# Patient Record
Sex: MALE | Race: WHITE | NOT HISPANIC OR LATINO | URBAN - METROPOLITAN AREA
[De-identification: names, ages, dates, MRNs, and addresses within clinical notes are randomized per-mention and may not be internally consistent; named-entity substitution may affect disease eponyms.]

---

## 2023-11-20 ENCOUNTER — HOSPITAL ENCOUNTER (EMERGENCY)
Facility: HOSPITAL | Age: 73
Discharge: HOME | End: 2023-11-20
Attending: STUDENT IN AN ORGANIZED HEALTH CARE EDUCATION/TRAINING PROGRAM
Payer: OTHER GOVERNMENT

## 2023-11-20 VITALS
SYSTOLIC BLOOD PRESSURE: 147 MMHG | HEIGHT: 67 IN | HEART RATE: 79 BPM | OXYGEN SATURATION: 97 % | TEMPERATURE: 98 F | DIASTOLIC BLOOD PRESSURE: 71 MMHG | RESPIRATION RATE: 18 BRPM | WEIGHT: 171.75 LBS | BODY MASS INDEX: 26.96 KG/M2

## 2023-11-20 DIAGNOSIS — M25.512 ACUTE PAIN OF LEFT SHOULDER: Primary | ICD-10-CM
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
## 2023-11-20 PROCEDURE — 25000003 PHARM REV CODE 250: Performed by: STUDENT IN AN ORGANIZED HEALTH CARE EDUCATION/TRAINING PROGRAM

## 2023-11-20 PROCEDURE — 99283 EMERGENCY DEPT VISIT LOW MDM: CPT

## 2023-11-20 RX ORDER — ACETAMINOPHEN 500 MG
1000 TABLET ORAL
Status: COMPLETED | OUTPATIENT
Start: 2023-11-20 | End: 2023-11-20

## 2023-11-20 RX ORDER — OXYCODONE AND ACETAMINOPHEN 5; 325 MG/1; MG/1
1 TABLET ORAL EVERY 6 HOURS PRN
Qty: 15 TABLET | Refills: 0 | Status: SHIPPED | OUTPATIENT
Start: 2023-11-20

## 2023-11-20 RX ORDER — IBUPROFEN 800 MG/1
800 TABLET ORAL
Status: COMPLETED | OUTPATIENT
Start: 2023-11-20 | End: 2023-11-20

## 2023-11-20 RX ADMIN — ACETAMINOPHEN 1000 MG: 500 TABLET ORAL at 01:11

## 2023-11-20 RX ADMIN — IBUPROFEN 800 MG: 800 TABLET, FILM COATED ORAL at 01:11

## 2023-11-20 NOTE — ED PROVIDER NOTES
Encounter Date: 11/20/2023       History     Chief Complaint   Patient presents with    Fall     73 year old male with a PMHx of anxiety, BPH presents to the ED with left shoulder pain. Had a fall off a 4 ft ladder on Saturday, landing on concrete on his left shoulder. NO head trauma. No LOC, no headache, no neck pain, no back pain. Ambulatory. States ROM has been difficult with the left shoulder. He has pain from the left shoulder to the left elbow. No numbness, paresthesia, weakness of the left arm.        Review of patient's allergies indicates:   Allergen Reactions    Penicillins Blisters, Nausea And Vomiting, Rash, Shortness Of Breath, Swelling and Hives     Other reaction(s): SWELLING (NON-SPECIFIC), Swelling, Blister    Sulfa (sulfonamide antibiotics) Hives, Shortness Of Breath and Swelling     Past Medical History:   Diagnosis Date    Anxiety     BPH with elevated PSA     Insomnia     Urinary retention      Past Surgical History:   Procedure Laterality Date    APPENDECTOMY      COLONOSCOPY  2002    about 20 years ago    COLONOSCOPY N/A 3/17/2022    Procedure: COLONOSCOPY;  Surgeon: Zahraa Benjamin MD;  Location: [facility];  Service: Endoscopy;  Laterality: N/A;     Family History   Problem Relation Age of Onset    Kidney failure Mother     Lung cancer Father     Colon cancer Paternal Uncle      Social History     Tobacco Use    Smoking status: Never    Smokeless tobacco: Never   Substance Use Topics    Alcohol use: Not Currently    Drug use: Never     Review of Systems   Constitutional:  Negative for chills and fever.   HENT:  Negative for congestion, rhinorrhea and sneezing.    Eyes:  Negative for discharge and redness.   Respiratory:  Negative for cough and shortness of breath.    Cardiovascular:  Negative for chest pain and palpitations.   Gastrointestinal:  Negative for abdominal pain, diarrhea and vomiting.   Genitourinary:  Negative for difficulty urinating, flank pain and urgency.    Musculoskeletal:  Positive for arthralgias. Negative for back pain, joint swelling and neck pain.   Skin:  Negative for rash and wound.   Neurological:  Negative for weakness, numbness and headaches.       Physical Exam     Initial Vitals [11/20/23 0128]   BP Pulse Resp Temp SpO2   (!) 147/71 84 18 97.8 °F (36.6 °C) 98 %      MAP       --         Physical Exam    Nursing note and vitals reviewed.  Constitutional: He appears well-developed. He is not diaphoretic. No distress.   HENT:   Head: Normocephalic and atraumatic.   Right Ear: External ear normal.   Left Ear: External ear normal.   Nose: Nose normal.   Eyes: Conjunctivae are normal. Right eye exhibits no discharge. Left eye exhibits no discharge. No scleral icterus.   Cardiovascular:  Normal rate and regular rhythm.           Pulmonary/Chest: Breath sounds normal. No stridor. No respiratory distress. He has no wheezes. He has no rhonchi. He has no rales.   Abdominal: Abdomen is soft. He exhibits no distension. There is no abdominal tenderness. There is no guarding.   Musculoskeletal:         General: No edema.      Left shoulder: Tenderness and bony tenderness present. Decreased range of motion.      Left upper arm: Tenderness present. No swelling, deformity or bony tenderness.      Left elbow: No swelling or deformity. Normal range of motion. Tenderness present.      Cervical back: No bony tenderness.      Thoracic back: No bony tenderness.      Lumbar back: No bony tenderness.      Comments: Ambulatory     Neurological: He is alert and oriented to person, place, and time. GCS score is 15. GCS eye subscore is 4. GCS verbal subscore is 5. GCS motor subscore is 6.   Skin: Skin is warm and dry. Capillary refill takes less than 2 seconds.   Psychiatric: He has a normal mood and affect.         ED Course   Procedures  Labs Reviewed - No data to display       Imaging Results              X-Ray Shoulder 2 or More Views Left (Final result)  Result time 11/20/23  08:15:32      Final result by Karen Leal MD (11/20/23 08:15:32)                   Impression:      As above.      Electronically signed by: Karen Leal MD  Date:    11/20/2023  Time:    08:15               Narrative:    EXAMINATION:  XR SHOULDER COMPLETE 2 OR MORE VIEWS LEFT    CLINICAL HISTORY:  fall;    TECHNIQUE:  Two views of the left shoulder    COMPARISON:  None.    FINDINGS:  There are mild moderate degenerative changes of the acromioclavicular joint with mild degenerative changes of the glenohumeral joint.  Calcification adjacent to the greater tuberosity suggesting calcific tendinitis, measuring approximately 10 mm.  No fracture or dislocation is seen.                                       X-Ray Elbow Complete Left (Final result)  Result time 11/20/23 07:59:00      Final result by Karen Leal MD (11/20/23 07:59:00)                   Impression:      No evidence of fracture.No significant degenerative changes.      Electronically signed by: Karen Leal MD  Date:    11/20/2023  Time:    07:59               Narrative:    EXAMINATION:  XR ELBOW COMPLETE 3 VIEW LEFT    CLINICAL HISTORY:  Unspecified fall, initial encounter    TECHNIQUE:  Three views of the left elbow    COMPARISON:  None.    FINDINGS:  The alignment is within normal limits.  No displaced fractures identified.  No evidence of lytic or blastic lesions.Joint spaces are unremarkable.Soft tissues are unremarkable.                                       Medications   acetaminophen tablet 1,000 mg (1,000 mg Oral Given 11/20/23 0143)   ibuprofen tablet 800 mg (800 mg Oral Given 11/20/23 0143)     Medical Decision Making  Ddx: shoulder fx, humerus fx, elbow fx, cspine fx, rotator cuff injury, shoulder dislocation    Based on the patient's evaluation - patient appears well for discharge home. Xrays without any acute bony injuries/fractures or dislocations. Placed in a sling and discharged home with PCP f/u and pain control. Patient is  in agreement.    Amount and/or Complexity of Data Reviewed  Radiology: ordered.    Risk  OTC drugs.  Prescription drug management.                                   Clinical Impression:  Final diagnoses:  [W19.XXXA] Fall  [M25.512] Acute pain of left shoulder (Primary)          ED Disposition Condition    Discharge Stable          ED Prescriptions       Medication Sig Dispense Start Date End Date Auth. Provider    oxyCODONE-acetaminophen (PERCOCET) 5-325 mg per tablet Take 1 tablet by mouth every 6 (six) hours as needed for Pain. 15 tablet 11/20/2023 -- Stephen Cameron DO          Follow-up Information       Follow up With Specialties Details Why Contact Info    Aneta Hood, FNP Family Medicine Schedule an appointment as soon as possible for a visit in 3 days  [address] [facility] [id number]  [phone]               Stephen Cameron DO  11/20/23 [id number]

## 2023-11-20 NOTE — ED TRIAGE NOTES
Pt identified x2 pt identifiers. 72 YO male presents today via private vehicle from home with c/o fall. Patient states Saturday afternoon he fell off a ladder about 4 ft and landed on left shoulder. Reports has been trying ice and heat, ibuprofen around 6 PM. No pain improvement with ibuprofen or ice and heat. Denies head trauma or blood thinners.     Pt denies chest pain, dyspnea, chills, fever, n/v/d, or known COVID exposure.     Pt is AO x4, speaking in full clear sentences, respirations even and unlabored. Skin warm, dry, intact, appropriate for ethnicity.  N/V intact. No deformity noted.     Pt updated on plan of care. Patient verbalized understanding to inform RN of any changes in symptoms.

## 2024-10-30 ENCOUNTER — HOSPITAL ENCOUNTER (OUTPATIENT)
Dept: RADIOLOGY | Facility: HOSPITAL | Age: 74
Discharge: HOME | End: 2024-10-30
Attending: PHYSICIAN ASSISTANT
Payer: OTHER GOVERNMENT

## 2024-10-30 DIAGNOSIS — N31.9 NEUROGENIC BLADDER: ICD-10-CM

## 2024-10-30 DIAGNOSIS — R97.20 ELEVATED PSA: ICD-10-CM

## 2024-10-30 DIAGNOSIS — Z87.438 HISTORY OF PROSTATITIS: ICD-10-CM

## 2024-10-30 PROCEDURE — A9585 GADOBUTROL INJECTION: HCPCS | Performed by: PHYSICIAN ASSISTANT

## 2024-10-30 PROCEDURE — 25500020 PHARM REV CODE 255: Performed by: PHYSICIAN ASSISTANT

## 2024-10-30 PROCEDURE — 72197 MRI PELVIS W/O & W/DYE: CPT | Mod: 26,,, | Performed by: RADIOLOGY

## 2024-10-30 PROCEDURE — 72197 MRI PELVIS W/O & W/DYE: CPT | Mod: TC

## 2024-10-30 RX ORDER — GADOBUTROL 604.72 MG/ML
10 INJECTION INTRAVENOUS
Status: COMPLETED | OUTPATIENT
Start: 2024-10-30 | End: 2024-10-30

## 2024-10-30 RX ADMIN — GADOBUTROL 10 ML: 604.72 INJECTION INTRAVENOUS at 09:10

## 2025-02-11 DIAGNOSIS — R97.20 ELEVATED PROSTATE SPECIFIC ANTIGEN (PSA): Primary | ICD-10-CM
